# Patient Record
Sex: FEMALE | Race: BLACK OR AFRICAN AMERICAN | URBAN - METROPOLITAN AREA
[De-identification: names, ages, dates, MRNs, and addresses within clinical notes are randomized per-mention and may not be internally consistent; named-entity substitution may affect disease eponyms.]

---

## 2023-06-23 ENCOUNTER — OFFICE VISIT (OUTPATIENT)
Dept: URBAN - METROPOLITAN AREA CLINIC 92 | Facility: CLINIC | Age: 58
End: 2023-06-23

## 2023-06-27 ENCOUNTER — LAB OUTSIDE AN ENCOUNTER (OUTPATIENT)
Dept: URBAN - METROPOLITAN AREA CLINIC 74 | Facility: CLINIC | Age: 58
End: 2023-06-27

## 2023-06-27 ENCOUNTER — OFFICE VISIT (OUTPATIENT)
Dept: URBAN - METROPOLITAN AREA CLINIC 74 | Facility: CLINIC | Age: 58
End: 2023-06-27
Payer: COMMERCIAL

## 2023-06-27 ENCOUNTER — WEB ENCOUNTER (OUTPATIENT)
Dept: URBAN - METROPOLITAN AREA CLINIC 74 | Facility: CLINIC | Age: 58
End: 2023-06-27

## 2023-06-27 VITALS
SYSTOLIC BLOOD PRESSURE: 130 MMHG | DIASTOLIC BLOOD PRESSURE: 90 MMHG | BODY MASS INDEX: 24.25 KG/M2 | HEIGHT: 68 IN | HEART RATE: 59 BPM | WEIGHT: 160 LBS | TEMPERATURE: 97.2 F

## 2023-06-27 DIAGNOSIS — K21.9 GERD WITHOUT ESOPHAGITIS: ICD-10-CM

## 2023-06-27 DIAGNOSIS — R14.0 ABDOMINAL BLOATING: ICD-10-CM

## 2023-06-27 DIAGNOSIS — R63.4 UNINTENTIONAL WEIGHT LOSS: ICD-10-CM

## 2023-06-27 PROBLEM — 266435005: Status: ACTIVE | Noted: 2023-06-27

## 2023-06-27 PROBLEM — 448765001: Status: ACTIVE | Noted: 2023-06-27

## 2023-06-27 PROCEDURE — 99204 OFFICE O/P NEW MOD 45 MIN: CPT | Performed by: INTERNAL MEDICINE

## 2023-06-27 RX ORDER — BUTALBITAL, ASPIRIN, CAFFEINE AND CODEINE PHOSPHATE 50; 325; 40; 30 MG/1; MG/1; MG/1; MG/1
1 CAPSULE AS NEEDED CAPSULE ORAL
Status: ACTIVE | COMMUNITY

## 2023-06-27 RX ORDER — OXYCODONE HYDROCHLORIDE AND ACETAMINOPHEN 10; 325 MG/1; MG/1
1 TABLET AS NEEDED TABLET ORAL
Refills: 0 | Status: ACTIVE | COMMUNITY

## 2023-06-27 RX ORDER — ESTRADIOL 0.05 MG/D
1 PATCH TO SKIN PATCH TRANSDERMAL
Status: ACTIVE | COMMUNITY

## 2023-06-27 RX ORDER — ZOLPIDEM TARTRATE 5 MG/1
1 TABLET AT BEDTIME TABLET, FILM COATED ORAL ONCE A DAY
Status: ACTIVE | COMMUNITY

## 2023-06-27 NOTE — PHYSICAL EXAM GASTROINTESTINAL
Abdomen , soft, TTP in epigastric region, battery for spinal cord stimulator in place in anterior abdomen, nondistended , no guarding or rigidity , no masses palpable , normal bowel sounds , Liver and Spleen , no hepatomegaly present , no hepatosplenomegaly , liver nontender , spleen not palpable

## 2023-06-27 NOTE — HPI-TODAY'S VISIT:
58-year-old -American female here to establish with GI.  She has lost about 15 pounds unintentionally over the past 3 to 4 months.  Does not have any appetite.  She is eating well.  Occasional Percocet use for chronic back pain.  She has chronic kidney disease but is not on dialysis.  She is getting work-up for that.  No cardiac or lung issues.  Colonoscopy last year showed 2 small polyps per patient.  She has moved from [place] to [place] this year.  No recent abdominal wall imaging.  Has anxiety/depression that is under good control.  Apparently, had recent blood work that was normal

## 2023-06-29 ENCOUNTER — CLAIMS CREATED FROM THE CLAIM WINDOW (OUTPATIENT)
Dept: URBAN - METROPOLITAN AREA CLINIC 4 | Facility: CLINIC | Age: 58
End: 2023-06-29
Payer: COMMERCIAL

## 2023-06-29 ENCOUNTER — OFFICE VISIT (OUTPATIENT)
Dept: URBAN - METROPOLITAN AREA SURGERY CENTER 30 | Facility: SURGERY CENTER | Age: 58
End: 2023-06-29
Payer: COMMERCIAL

## 2023-06-29 DIAGNOSIS — K29.81 DUODENITIS WITH BLEEDING: ICD-10-CM

## 2023-06-29 DIAGNOSIS — K29.60 ADENOPAPILLOMATOSIS GASTRICA: ICD-10-CM

## 2023-06-29 DIAGNOSIS — K29.80 ACUTE DUODENITIS: ICD-10-CM

## 2023-06-29 PROCEDURE — 88342 IMHCHEM/IMCYTCHM 1ST ANTB: CPT | Performed by: PATHOLOGY

## 2023-06-29 PROCEDURE — 88305 TISSUE EXAM BY PATHOLOGIST: CPT | Performed by: PATHOLOGY

## 2023-06-29 PROCEDURE — 43239 EGD BIOPSY SINGLE/MULTIPLE: CPT | Performed by: INTERNAL MEDICINE

## 2023-06-29 PROCEDURE — G8907 PT DOC NO EVENTS ON DISCHARG: HCPCS | Performed by: INTERNAL MEDICINE

## 2023-08-22 ENCOUNTER — OFFICE VISIT (OUTPATIENT)
Dept: URBAN - METROPOLITAN AREA CLINIC 74 | Facility: CLINIC | Age: 58
End: 2023-08-22

## 2024-02-26 ENCOUNTER — OV EP (OUTPATIENT)
Dept: URBAN - METROPOLITAN AREA CLINIC 74 | Facility: CLINIC | Age: 59
End: 2024-02-26
Payer: COMMERCIAL

## 2024-02-26 VITALS
WEIGHT: 153 LBS | BODY MASS INDEX: 23.19 KG/M2 | HEART RATE: 80 BPM | TEMPERATURE: 97 F | HEIGHT: 68 IN | DIASTOLIC BLOOD PRESSURE: 74 MMHG | SYSTOLIC BLOOD PRESSURE: 124 MMHG

## 2024-02-26 DIAGNOSIS — R14.0 ABDOMINAL BLOATING: ICD-10-CM

## 2024-02-26 DIAGNOSIS — R63.4 UNINTENTIONAL WEIGHT LOSS: ICD-10-CM

## 2024-02-26 DIAGNOSIS — K21.9 GERD WITHOUT ESOPHAGITIS: ICD-10-CM

## 2024-02-26 PROCEDURE — 99213 OFFICE O/P EST LOW 20 MIN: CPT | Performed by: INTERNAL MEDICINE

## 2024-02-26 RX ORDER — ZOLPIDEM TARTRATE 5 MG/1
1 TABLET AT BEDTIME TABLET, FILM COATED ORAL ONCE A DAY
Status: ACTIVE | COMMUNITY

## 2024-02-26 RX ORDER — ESTRADIOL 0.05 MG/D
1 PATCH TO SKIN PATCH TRANSDERMAL
Status: ACTIVE | COMMUNITY

## 2024-02-26 RX ORDER — OXYCODONE HYDROCHLORIDE AND ACETAMINOPHEN 10; 325 MG/1; MG/1
1 TABLET AS NEEDED TABLET ORAL
Refills: 0 | Status: ACTIVE | COMMUNITY

## 2024-02-26 RX ORDER — BUTALBITAL, ASPIRIN, CAFFEINE AND CODEINE PHOSPHATE 50; 325; 40; 30 MG/1; MG/1; MG/1; MG/1
1 CAPSULE AS NEEDED CAPSULE ORAL
Status: ACTIVE | COMMUNITY

## 2024-02-26 NOTE — HPI-TODAY'S VISIT:
60 yo AAF presents with stomach growling and loose bowels since their endoscopy and CT scan last year, accompanied by significant weight loss and ongoing management of stage four kidney disease. EGD last year-mild gastritis. has lost 7 pounds in 7 months.  They have also experienced a rapid weight loss, dropping from 161 pounds to 153 pounds within a week. However, they have maintained a stable weight of 160 pounds since last July. The patient notes an improvement in stomach pain but continues to have loose bowels, which may be related to their current medications. The patient has a history of stage four kidney disease, which is being managed without the need for dialysis. They do not have high blood pressure or diabetes. The patient has been on pain medication for years and has been actively reducing their dosage. They underwent an arteriogram last month, during which a balloon was placed in the right artery. reflux is better. CT scan: Mal-rotated organs, likely congenital.2. Endoscopy: Inflammation in the stomach, no ulcer, celiac disease, or malabsorption.3. Colonoscopy (2022): Normal, no family history of colon cancer.4. Arteriogram: Balloon placed in the right artery last month.

## 2024-08-19 PROBLEM — 72007001: Status: ACTIVE | Noted: 2024-08-19

## 2024-08-19 PROBLEM — 196735001: Status: ACTIVE | Noted: 2024-08-19

## 2024-08-26 ENCOUNTER — DASHBOARD ENCOUNTERS (OUTPATIENT)
Age: 59
End: 2024-08-26

## 2024-08-26 ENCOUNTER — OFFICE VISIT (OUTPATIENT)
Dept: URBAN - METROPOLITAN AREA CLINIC 74 | Facility: CLINIC | Age: 59
End: 2024-08-26

## 2024-08-26 VITALS
HEIGHT: 68 IN | SYSTOLIC BLOOD PRESSURE: 118 MMHG | WEIGHT: 149.2 LBS | DIASTOLIC BLOOD PRESSURE: 78 MMHG | TEMPERATURE: 98.7 F | BODY MASS INDEX: 22.61 KG/M2 | HEART RATE: 59 BPM

## 2024-08-26 DIAGNOSIS — R19.7 INTERMITTENT DIARRHEA: ICD-10-CM

## 2024-08-26 DIAGNOSIS — R14.0 ABDOMINAL BLOATING: ICD-10-CM

## 2024-08-26 DIAGNOSIS — K29.80 PEPTIC DUODENITIS: ICD-10-CM

## 2024-08-26 PROCEDURE — 99214 OFFICE O/P EST MOD 30 MIN: CPT | Performed by: PHYSICIAN ASSISTANT

## 2024-08-26 RX ORDER — SODIUM BICARBONATE TAB 650 MG 650 MG
AS DIRECTED TAB ORAL
Status: ACTIVE | COMMUNITY

## 2024-08-26 RX ORDER — ESTRADIOL 0.05 MG/D
1 PATCH TO SKIN PATCH TRANSDERMAL
Status: ACTIVE | COMMUNITY

## 2024-08-26 RX ORDER — PANTOPRAZOLE SODIUM 40 MG/1
1 TABLET TABLET, DELAYED RELEASE ORAL ONCE A DAY
Qty: 90 TABLET | Refills: 3 | OUTPATIENT
Start: 2024-08-26

## 2024-08-26 RX ORDER — ZOLPIDEM TARTRATE 5 MG/1
1 TABLET AT BEDTIME TABLET, FILM COATED ORAL ONCE A DAY
Status: ACTIVE | COMMUNITY

## 2024-08-26 RX ORDER — OXYCODONE HYDROCHLORIDE AND ACETAMINOPHEN 10; 325 MG/1; MG/1
1 TABLET AS NEEDED TABLET ORAL
Refills: 0 | Status: ACTIVE | COMMUNITY

## 2024-08-26 RX ORDER — BUTALBITAL, ASPIRIN, CAFFEINE AND CODEINE PHOSPHATE 50; 325; 40; 30 MG/1; MG/1; MG/1; MG/1
1 CAPSULE AS NEEDED CAPSULE ORAL
Status: ACTIVE | COMMUNITY

## 2024-08-26 NOTE — HPI-TODAY'S VISIT:
The patient is 59-year-old female with past medical history as noted below known to Dr. Noriega is presenting to our clinic today with complaint of abdominal pain and bloating.  The patient's last Colonoscopy was in 2022 and last EGD was in 2023. The patient was seen at [facility] and advised to follow up with her PCP and GI physician. She complains of abdominal pain which has subsided. She has diarrhea postprandial. She has at least three loose watery stool sper day. She has CKD Stgae IV. The patient just had spine stimualtor placed in 08/2024. She has a copy of her Colonoscopu from 2022 on her phone. No other GI issues today.   Diagnostic studies: -- CT scan of the abdomen and pelvis on 03/26/2024 with no acute abnormality of abdomen or pelvis.  Liver, spleen, pancreas, adrenal glands, and kidneys are unremarkable.  Gallbladder surgically absent.  Spinal nerve stimulator present in the left flank subcutaneous tissue.  -- Labs on 03/26/2024 CBC, CMP, and Lipase unremarakable.   Procedures: -- EGD with biopsy on 06/29/2023 by Dr. Noriega noted normal esophagus.  Chronic gastritis.  Normal examined duodenum.  Biopsy duodenum with peptic duodenitis.  No evidence of celiac's sprue.  Stomach with chronic gastritis.  No evidence of H.pylori organism or intestinal metaplasia.  -- Colonoscopy on 03/23/2022 by Dr. Ty Valadez noted two 5 to 6 mm polyps in the transverse colon and at the hepatic flexure, removed with a cold snare.  Resected and retrieved.  The examination was otherwise normal on direct and retroflexion view.  Biopsy with tubular adenoma colon polyps.  Repeat colonoscopy in 5 years for surveillance.

## 2024-09-09 ENCOUNTER — LAB OUTSIDE AN ENCOUNTER (OUTPATIENT)
Dept: URBAN - METROPOLITAN AREA CLINIC 74 | Facility: CLINIC | Age: 59
End: 2024-09-09

## 2024-09-15 LAB
ADENOVIRUS F 40/41: NOT DETECTED
CAMPYLOBACTER: NOT DETECTED
CLOSTRIDIUM DIFFICILE: NOT DETECTED
ENTAMOEBA HISTOLYTICA: NOT DETECTED
ENTEROAGGREGATIVE E.COLI: NOT DETECTED
ENTEROTOXIGENIC E.COLI: NOT DETECTED
ESCHERICHIA COLI O157: NOT DETECTED
GIARDIA LAMBLIA: NOT DETECTED
NOROVIRUS GI/GII: NOT DETECTED
PANCREATICELASTASE ELISA, STOOL: (no result)
ROTAVIRUS A: NOT DETECTED
SALMONELLA SPP.: NOT DETECTED
SHIGA-LIKE TOXIN PRODUCING E.COLI: NOT DETECTED
SHIGELLA SPP. / ENTEROINVASIVE E.COLI: NOT DETECTED
STOOL, WHITE BLOOD CELLS: (no result)
VIBRIO PARAHAEMOLYTICUS: NOT DETECTED
VIBRIO SPP.: NOT DETECTED
YERSINIA ENTEROCOLITICA: NOT DETECTED

## 2024-09-24 ENCOUNTER — OFFICE VISIT (OUTPATIENT)
Dept: URBAN - METROPOLITAN AREA CLINIC 74 | Facility: CLINIC | Age: 59
End: 2024-09-24

## 2024-09-24 NOTE — HPI-TODAY'S VISIT:
The patient is 59-year-old female with past medical history as noted below known to Dr. Noriega is presenting to our clinic today  to discuss stool study results. She was started on Pantoprazole 40 mg once daily last visit.     Diagnostic studies: -- Stool study on 09/10/2024 GPP, WBC's, and Pancreatic elastase are normal.   -- CT scan of abdomen and pelvis on 03/26/2024 with no acute abnormality of abdomen or pelvis.  Liver, spleen, pancreas, adrenal glands, and kidneys are unremarkable.  Gallbladder surgically absent.  Spinal nerve stimulator present in the left flank subcutaneous tissue.  -- Labs on 03/26/2024 CBC, CMP, and Lipase unremarakable.   Procedures: -- EGD with biopsy on 06/29/2023 by Dr. Noriega noted normal esophagus.  Chronic gastritis.  Normal examined duodenum.  Biopsy duodenum with peptic duodenitis.  No evidence of celiac's sprue.  Stomach with chronic gastritis.  No evidence of H.pylori organism or intestinal metaplasia.  -- Colonoscopy on 03/23/2022 by Dr. Ty Valadez noted two 5 to 6 mm polyps in the transverse colon and at the hepatic flexure, removed with a cold snare.  Resected and retrieved.  The examination was otherwise normal on direct and retroflexion view.  Biopsy with tubular adenoma colon polyps.  Repeat colonoscopy in 5 years for surveillance.

## 2024-09-26 ENCOUNTER — OFFICE VISIT (OUTPATIENT)
Dept: URBAN - METROPOLITAN AREA CLINIC 74 | Facility: CLINIC | Age: 59
End: 2024-09-26

## 2024-10-02 ENCOUNTER — OFFICE VISIT (OUTPATIENT)
Dept: URBAN - METROPOLITAN AREA CLINIC 74 | Facility: CLINIC | Age: 59
End: 2024-10-02
Payer: COMMERCIAL

## 2024-10-02 VITALS
DIASTOLIC BLOOD PRESSURE: 70 MMHG | TEMPERATURE: 98.5 F | BODY MASS INDEX: 22.46 KG/M2 | HEIGHT: 68 IN | SYSTOLIC BLOOD PRESSURE: 110 MMHG | HEART RATE: 52 BPM | WEIGHT: 148.2 LBS

## 2024-10-02 DIAGNOSIS — R14.0 ABDOMINAL BLOATING: ICD-10-CM

## 2024-10-02 DIAGNOSIS — K29.80 PEPTIC DUODENITIS: ICD-10-CM

## 2024-10-02 DIAGNOSIS — Q43.3 CONGENITAL MALROTATION OF INTESTINE: ICD-10-CM

## 2024-10-02 DIAGNOSIS — R19.7 INTERMITTENT DIARRHEA: ICD-10-CM

## 2024-10-02 DIAGNOSIS — K29.30 CHRONIC SUPERFICIAL GASTRITIS WITHOUT BLEEDING: ICD-10-CM

## 2024-10-02 PROBLEM — 29980002: Status: ACTIVE | Noted: 2024-10-02

## 2024-10-02 PROCEDURE — 99214 OFFICE O/P EST MOD 30 MIN: CPT | Performed by: PHYSICIAN ASSISTANT

## 2024-10-02 RX ORDER — BUTALBITAL, ASPIRIN, CAFFEINE AND CODEINE PHOSPHATE 50; 325; 40; 30 MG/1; MG/1; MG/1; MG/1
1 CAPSULE AS NEEDED CAPSULE ORAL
Status: ACTIVE | COMMUNITY

## 2024-10-02 RX ORDER — ZOLPIDEM TARTRATE 5 MG/1
1 TABLET AT BEDTIME TABLET, FILM COATED ORAL ONCE A DAY
Status: ACTIVE | COMMUNITY

## 2024-10-02 RX ORDER — PANTOPRAZOLE SODIUM 40 MG/1
1 TABLET TABLET, DELAYED RELEASE ORAL ONCE A DAY
Qty: 90 TABLET | Refills: 3 | Status: ACTIVE | COMMUNITY
Start: 2024-08-26

## 2024-10-02 RX ORDER — ESTRADIOL 0.05 MG/D
1 PATCH TO SKIN PATCH TRANSDERMAL
Status: ACTIVE | COMMUNITY

## 2024-10-02 RX ORDER — OXYCODONE HYDROCHLORIDE AND ACETAMINOPHEN 10; 325 MG/1; MG/1
1 TABLET AS NEEDED TABLET ORAL
Refills: 0 | Status: ACTIVE | COMMUNITY

## 2024-10-02 RX ORDER — SODIUM BICARBONATE TAB 650 MG 650 MG
AS DIRECTED TAB ORAL
Status: ACTIVE | COMMUNITY

## 2024-10-02 RX ORDER — FAMOTIDINE 40 MG/1
1 TABLET TABLET, FILM COATED ORAL ONCE A DAY
Qty: 90 TABLET | Refills: 1 | OUTPATIENT
Start: 2024-10-02

## 2024-10-02 NOTE — HPI-TODAY'S VISIT:
The patient is 59-year-old female with past medical history as noted below known to Dr. Noriega is presenting to our clinic today  to discuss stool study results. She was started on Pantoprazole 40 mg once daily last visit. She is doing well over all and no new GI issues today.   Diagnostic studies: -- Stool study on 09/10/2024 GPP, WBC's, and Pancreatic elastase are normal.   -- CT scan of abdomen and pelvis on 03/26/2024 with no acute abnormality of abdomen or pelvis.  Liver, spleen, pancreas, adrenal glands, and kidneys are unremarkable.  Gallbladder surgically absent.  Spinal nerve stimulator present in the left flank subcutaneous tissue.  -- Labs on 03/26/2024 CBC, CMP, and Lipase unremarakable.   Procedures: -- EGD with biopsy on 06/29/2023 by Dr. Noriega noted normal esophagus.  Chronic gastritis.  Normal examined duodenum.  Biopsy duodenum with peptic duodenitis.  No evidence of celiac's sprue.  Stomach with chronic gastritis.  No evidence of H.pylori organism or intestinal metaplasia.  -- Colonoscopy on 03/23/2022 by Dr. Ty Valadez noted two 5 to 6 mm polyps in the transverse colon and at the hepatic flexure, removed with a cold snare.  Resected and retrieved.  The examination was otherwise normal on direct and retroflexion view.  Biopsy with tubular adenoma colon polyps.  Repeat colonoscopy in 5 years for surveillance.

## 2025-04-02 ENCOUNTER — OFFICE VISIT (OUTPATIENT)
Dept: URBAN - METROPOLITAN AREA CLINIC 74 | Facility: CLINIC | Age: 60
End: 2025-04-02
Payer: COMMERCIAL

## 2025-04-02 DIAGNOSIS — R10.13 EPIGASTRIC ABDOMINAL PAIN: ICD-10-CM

## 2025-04-02 DIAGNOSIS — K29.80 PEPTIC DUODENITIS: ICD-10-CM

## 2025-04-02 DIAGNOSIS — Q43.3 CONGENITAL MALROTATION OF INTESTINE: ICD-10-CM

## 2025-04-02 DIAGNOSIS — K29.30 CHRONIC SUPERFICIAL GASTRITIS WITHOUT BLEEDING: ICD-10-CM

## 2025-04-02 PROCEDURE — 99214 OFFICE O/P EST MOD 30 MIN: CPT | Performed by: PHYSICIAN ASSISTANT

## 2025-04-02 RX ORDER — PANTOPRAZOLE SODIUM 40 MG/1
1 TABLET TABLET, DELAYED RELEASE ORAL ONCE A DAY
Qty: 90 TABLET | Refills: 3 | Status: DISCONTINUED | COMMUNITY
Start: 2024-08-26

## 2025-04-02 RX ORDER — ESTRADIOL 0.05 MG/D
1 PATCH TO SKIN PATCH TRANSDERMAL
Status: ACTIVE | COMMUNITY

## 2025-04-02 RX ORDER — OXYCODONE HYDROCHLORIDE AND ACETAMINOPHEN 10; 325 MG/1; MG/1
1 TABLET AS NEEDED TABLET ORAL
Refills: 0 | Status: ACTIVE | COMMUNITY

## 2025-04-02 RX ORDER — FAMOTIDINE 40 MG/1
1 TABLET TABLET, FILM COATED ORAL ONCE A DAY
Qty: 90 TABLET | Refills: 1 | Status: ACTIVE | COMMUNITY
Start: 2024-10-02

## 2025-04-02 RX ORDER — FAMOTIDINE 40 MG/1
1 TABLET TABLET, FILM COATED ORAL ONCE A DAY
Qty: 90 TABLET | Refills: 1
Start: 2024-10-02

## 2025-04-02 RX ORDER — SODIUM BICARBONATE TAB 650 MG 650 MG
AS DIRECTED TAB ORAL
Status: ACTIVE | COMMUNITY

## 2025-04-02 RX ORDER — BUTALBITAL, ASPIRIN, CAFFEINE AND CODEINE PHOSPHATE 50; 325; 40; 30 MG/1; MG/1; MG/1; MG/1
1 CAPSULE AS NEEDED CAPSULE ORAL
Status: ACTIVE | COMMUNITY

## 2025-04-02 RX ORDER — ZOLPIDEM TARTRATE 5 MG/1
1 TABLET AT BEDTIME TABLET, FILM COATED ORAL ONCE A DAY
Status: ACTIVE | COMMUNITY

## 2025-04-02 NOTE — HPI-TODAY'S VISIT:
The patient is 60-year-old female with past medical history as noted below known to Dr. Noriega is presenting to our clinic today with complaint of epigastric abdominal pain x 2 weeks.  She stated that she saw her PCP 2 weeks ago she had H.pylori breath test with negative result.  She was started on Omeprazole 40 mg 1 tablet daily and Carafate 1 g every 12 hours for 2 weeks therapy.  Her symptoms improved on medical management.  She also stated that she had a CT scan of the abdomen and pelvis on 04/01/2025.  Last office visit due to chronic kidney disease stage IV we will taper her off Pantoprazole and start her on Famotidine 40 mg once daily.  However she has not been taking her Famotidine.  She denies any other GI complaints.  Diagnostic studies: -- Stool study on 09/10/2024 GPP, WBC's, and Pancreatic elastase are normal.   -- CT scan of abdomen and pelvis on 03/26/2024 with no acute abnormality of abdomen or pelvis.  Liver, spleen, pancreas, adrenal glands, and kidneys are unremarkable.  Gallbladder surgically absent.  Spinal nerve stimulator present in the left flank subcutaneous tissue.  -- Labs on 03/26/2024 CBC, CMP, and Lipase unremarakable.   Procedures: -- EGD with biopsy on 06/29/2023 by Dr. Noriega noted normal esophagus.  Chronic gastritis.  Normal examined duodenum.  Biopsy duodenum with peptic duodenitis.  No evidence of celiac's sprue.  Stomach with chronic gastritis.  No evidence of H.pylori organism or intestinal metaplasia.  -- Colonoscopy on 03/23/2022 by Dr. Ty Valadez noted two 5 to 6 mm polyps in the transverse colon and at the hepatic flexure, removed with a cold snare.  Resected and retrieved.  The examination was otherwise normal on direct and retroflexion view.  Biopsy with tubular adenoma colon polyps.  Repeat colonoscopy in 5 years for surveillance.

## 2025-05-22 ENCOUNTER — OFFICE VISIT (OUTPATIENT)
Dept: URBAN - METROPOLITAN AREA CLINIC 74 | Facility: CLINIC | Age: 60
End: 2025-05-22

## 2025-05-22 RX ORDER — BUTALBITAL, ASPIRIN, CAFFEINE AND CODEINE PHOSPHATE 50; 325; 40; 30 MG/1; MG/1; MG/1; MG/1
1 CAPSULE AS NEEDED CAPSULE ORAL
Status: ACTIVE | COMMUNITY

## 2025-05-22 RX ORDER — ESTRADIOL 0.05 MG/D
1 PATCH TO SKIN PATCH TRANSDERMAL
Status: ACTIVE | COMMUNITY

## 2025-05-22 RX ORDER — FAMOTIDINE 40 MG/1
1 TABLET TABLET, FILM COATED ORAL ONCE A DAY
Qty: 90 TABLET | Refills: 1
Start: 2025-05-07

## 2025-05-22 RX ORDER — ZOLPIDEM TARTRATE 5 MG/1
1 TABLET AT BEDTIME TABLET, FILM COATED ORAL ONCE A DAY
Status: ACTIVE | COMMUNITY

## 2025-05-22 RX ORDER — SODIUM BICARBONATE TAB 650 MG 650 MG
AS DIRECTED TAB ORAL
Status: ACTIVE | COMMUNITY

## 2025-05-22 RX ORDER — FAMOTIDINE 40 MG/1
1 TABLET TABLET, FILM COATED ORAL ONCE A DAY
Qty: 90 TABLET | Refills: 1 | Status: ACTIVE | COMMUNITY
Start: 2024-10-02

## 2025-05-22 RX ORDER — OXYCODONE HYDROCHLORIDE AND ACETAMINOPHEN 10; 325 MG/1; MG/1
1 TABLET AS NEEDED TABLET ORAL
Refills: 0 | Status: ACTIVE | COMMUNITY

## 2025-05-22 NOTE — HPI-TODAY'S VISIT:
The patient is 60-year-old female with past medical history as noted below known to Dr. Noriega is presenting to our clinic today for follow up appointment. She continues on Famotidine 40 mg once daily.  Diagnostic studies: -- CT scan of the abdomen and pelvis without IV contrast on 04/02/2025 noted interval removal of device over the left buttock with residual fluid collection, possible seroma. Superimposed infection is difficult to exclude. Liver, pancreas, spleen, adrenal glands, and kidneys are unremarkable. Gallbladder surgically absent. Gastrointestinal tract with no acute abnormality.  -- Stool study on 09/10/2024 GPP, WBC's, and Pancreatic elastase are normal.   -- CT scan of abdomen and pelvis on 03/26/2024 with no acute abnormality of abdomen or pelvis.  Liver, spleen, pancreas, adrenal glands, and kidneys are unremarkable.  Gallbladder surgically absent.  Spinal nerve stimulator present in the left flank subcutaneous tissue.  -- Labs on 03/26/2024 CBC, CMP, and Lipase unremarakable.   Procedures: -- EGD with biopsy on 06/29/2023 by Dr. Noriega noted normal esophagus.  Chronic gastritis.  Normal examined duodenum.  Biopsy duodenum with peptic duodenitis.  No evidence of celiac's sprue.  Stomach with chronic gastritis.  No evidence of H.pylori organism or intestinal metaplasia.  -- Colonoscopy on 03/23/2022 by Dr. Ty Valadez noted two 5 to 6 mm polyps in the transverse colon and at the hepatic flexure, removed with a cold snare.  Resected and retrieved.  The examination was otherwise normal on direct and retroflexion view.  Biopsy with tubular adenoma colon polyps.  Repeat colonoscopy in 5 years for surveillance.